# Patient Record
(demographics unavailable — no encounter records)

---

## 2024-11-11 NOTE — ASSESSMENT
[FreeTextEntry1] :  Surekha Balbuena is a 76 y/o female with compensated MASH cirrhosis, here for follow-up.  #Cirrhosis 2/2 MASLD, well compensated, MELD 8 - Chronic liver dx w/u notable for +REBA (non-specific) and +AMA, however, with normal LFTs and ALP, not clinically significant and does not have active PBC disease; increased risk to develop PBC and will monitor - Euvolemic on exam, no indication for diuretics - Monitor daily weights and report > 2-3 lb/day - HE: no sx, reinforced s/s and advised to report - pHTN: 5/19/22 EGD w/o EV > followed up with Dr. Chairez for EGD/EUS consult and left to patient preference to defer scoping at this time - Normal Plts and 11/2023 Fibroscan KpA 16.1, making borderline Baveno 7 assessment > update fibroscan  - HCC: 6/2024 US Abd w/o c/f malignancy > update again now and maintain biannual screening - Advised to avoid nephrotoxic agents - Encouraged to c/w Rudy in efforts of maintaining healthy weight and optimization of metabolic profile - Keep ETOH d/c given risk for further liver fibrosis/decompensation - Reinforced s/s of liver decompensation and advised to report immediately - No indication for liver txp evaluation given well compensated disease, low MELD, no episodes of liver decompensation and advanced age; will monitor clinical trajectory and medically manage  Update labs prior to f/u appt. RTC in 6 months. Plan discussed with Dr. Chaudhary.  Darline Lees, MSN, [facility] Transplant Hepatology Nurse Practitioner [facility] [address] [place] [address] T: [phone] | F: [phone].

## 2024-11-11 NOTE — PHYSICAL EXAM
[Scleral Icterus] : No Scleral Icterus [Spider Angioma] : No spider angioma(s) were observed [Ascites Shifting Dullness] : no shifting dullness of ascites [Asterixis] : no asterixis observed [Jaundice] : No jaundice [General Appearance - Alert] : alert [General Appearance - Well Nourished] : well nourished [Sclera] : the sclera and conjunctiva were normal [] : the neck was supple [Auscultation Breath Sounds / Voice Sounds] : lungs were clear to auscultation bilaterally [Respiration, Rhythm And Depth] : normal respiratory rhythm and effort [Heart Rate And Rhythm] : heart rate was normal and rhythm regular [Heart Sounds] : normal S1 and S2 [Bowel Sounds] : normal bowel sounds [Abdomen Soft] : soft [Abdomen Tenderness] : non-tender [Skin Color & Pigmentation] : normal skin color and pigmentation [Oriented To Time, Place, And Person] : oriented to person, place, and time

## 2024-11-11 NOTE — END OF VISIT
[facility] [phone]    [facility] VIDEO VISIT PROGRESS NOTE    CHIEF COMPLAINT  Chief Complaint   Patient presents with    Video Visit       SUBJECTIVE  HISTORY OF PRESENT ILLNESS:   Karime Carvalho is a 17 year old female who consents to a two-way Video Visit (V-Visit) for evaluation of an eye concern.      Karime reports redness , irritation, clear/watery drainage, purulent drainage, and crusting to the left eye. Symptom onset: 2/13 .   Reports use of contact lenses.  Photophobia: denies  Vision changes/blurred vision: denies  Foreign body sensation: denies   Injury/trauma to the eye: denies  The patient denies deep eyeball pain, pain with eyeball movement, headache, nausea, vomiting, or fever.     Associated symptoms or recent upper respiratory illness symptoms: none      Contact with person with similar symptoms: denies  The patient has tried nothing for their current symptoms, which has been ineffective.    HISTORIES  Current medications, allergies, past medical history, past surgical history and social history have been reviewed and updated in the electronic medical record.    ALLERGIES  ALLERGIES:  No Known Allergies     MEDICATIONS  Current Outpatient Medications   Medication Sig    ofloxacin (OCUFLOX) 0.3 % ophthalmic solution Indications: Bacterial Conjunctivitis 1 drop to affected eye(s) every 2 hours while awake until eye(s) clear, then 4 time daily for total of 5-7 days.    desogestrel-ethinyl estradiol (APRI) 0.15-30 MG-MCG per tablet Take 1 tablet by mouth daily.     No current facility-administered medications for this visit.        OBJECTIVE  PHYSICAL EXAM:   Information acquired with patient assistance, demonstration, and feedback due to two-way video visit method of visit. Portions of assessment may be difficult to visualize precisely given nature of technology and limitations of assessment with virtual platform.   CONSTITUTIONAL: Well-hydrated, well-nourished  [Time Spent: ___ minutes] : I have spent [unfilled] minutes of time on the encounter which excludes teaching and separately reported services. patient who appears to be in no acute distress. Awake, alert and cooperative.  NEUROLOGIC: Alert and oriented x 3.    SKIN: Skin normal color, no visual texture change, with no lesions or eruptions.  PSYCHIATRIC: Oriented to person, place, and time. The patient was able to demonstrate good judgement and reason, without hallucinations, abnormal affect or abnormal behaviors during the examination.   EYES: Right eye: conjunctiva normal, sclera normal, lid and lashes are normal, and no tearing or drainage. Left eye: conjunctiva erythematous and purulent drainage. No pain with palpation by patient bilaterally. Bilateral pupils are equal and round. Bilateral extraocular movements are normal. No ciliary flush or corneal opacity.     Inspection of the eye under magnification and including inversion of the upper and lower eye lids was not possible due to video constraints to exam modality therefore unable to rule out the presence of lesions, abrasions, ulcers, infiltrate, dendritic markings, or other abnormal findings.    ASSESSMENT/PLAN   Bacterial conjunctivitis  - ofloxacin (OCUFLOX) 0.3 % ophthalmic solution; Indications: Bacterial Conjunctivitis 1 drop to affected eye(s) every 2 hours while awake until eye(s) clear, then 4 time daily for total of 5-7 days.  Dispense: 5 mL; Refill: 0  -no contacts until eye symptoms have resolved  -don't touch or rub eyes  -wash hands frequently  -if symptoms persist or worsen an in person visit is recommended     The patient is in no acute distress and no evidence of red flag symptoms including eye pain/pressure, vision changes, fever, double vision were discussed and warrant immediate evaluation.     FOLLOW-UP   Return for As needed .  If the patient has unresolved symptoms, they have been advised to seek an in person evaluation with their primary care provider or at an Urgent Care/Immediate Care.    If symptoms get worse, the patient should be seen immediately at an Urgent  Care/Immediate Care or the Emergency Department.       PATIENT INSTRUCTIONS  Attached in After Visit Summary  The patient verbalizes understanding of the diagnosis and plan of care. There were no further questions or concerns.   They were advised to contact the [facility] RN with any questions at [phone].    CONSENT:  Patient consent was obtained for this Video Visit using the [employer] aly.    Clinical Location: [facility] Tele[facility] Visit - Home office  Patient is located at home in the [facility]    RANJEET Mckeon  2/14/2024  8:22 AM

## 2024-11-11 NOTE — HISTORY OF PRESENT ILLNESS
[FreeTextEntry1] : Transplant Hepatologist: Dana Chaudhary DO Transplant Hepatology NP: Darline Lees, [facility]  Surekha Balbuena is a 78 y/o female with a PMH of HTN, HLD (> 20 yrs), T2DM (dx 8-10 yrs), Osteoporosis, thyroid dysfunction, abnormal liver tests and MASH cirrhosis, here for follow-up.  Patient established care after referral from Dr. Chairez for evaluation of abnormal liver tests. Patient reports first being made aware of elevated LFTs ~15 yrs ago by PCP and no further w/u done. She then established care with new PCP that noted elevated LFTs and thought it was r/t T2DM and increased Metformin to aid with metabolic profile. She then established care with GI Dr. Chairez for colonoscopy that noted +AMA and elevated LFTs and was subsequently referred to us for further evaluation. No prior liver bx. No episodes of liver decompensation. No known family history of liver disease. No IVDU. No blood transfusions. OTC use of Vitamin D, Collagen and MVI. Denies prior or current heavy ETOH use. Long standing hx of being overweight with heaviest weight ~170 lbs one year ago, however, intentionally lost 20 lbs with Keto diet and has been maintained at current weight since (150 lbs). +Metabolic syndrome with longstanding HTN/HLD/T2DM. Born in  and ethnicity from [place]. 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 Independent in ADLs/iADLs.  - 3/28/22 Fibroscan: S0/F4 ( & kPa 23.9) - 5/19/22 EGD w/o EV - 8/19/23: +AMA 1:160, AST/ALT 37/31, normal ALP and Tbili - 3/24/23 US Abd: increased liver echogenicity c/w likely steatosis - 9/29/23: AST/ALT 25/22 - 10/31/23 CLD w/u: +REBA, +AMA, and mildly elevated AST (normal ALP) > +AMA not clinically significant given normal ALP (increased risk to develop PBC, will follow) - 11/2/23 Fibroscan F4 - 12/6/23 MRE: mild steatosis, no iron deposition and +cirrhosis (no ascites or splenomegaly) - 1.2cm Pancreatic cyst likely IPMN, increased in size since 2017 - 6/2024 US Abd: +Cirrhosis and no HCC  Stable weight since last visit, currently 135 lbs. Not really exercising and following healthy diet. No episodes of decompensation.   In the interim since last visit, there have been no additional illnesses or hospitalizations. No episodes of liver decompensation. Still maintained on low dose Mounjaro and weight stable since last visit, currently 135 lbs and 16 lbs down since establishing care with us. Not really exercising but maintaining diligence on healthy dieting. Patient's allergies, medications, past medical, surgical, family, and social histories were reviewed and updated as appropriate. Seen in clinic today, reports that she feels well and is w/o complaints. Denies any recent fatigue, fevers, chills, cough, lightheadedness, AMS, abdominal pain, n/v, diarrhea, hematochezia, hematemesis, and melena. Denies alcohol, tobacco, or recreational drug use.  MELD 8 (10/31/24)

## 2024-11-11 NOTE — REVIEW OF SYSTEMS
[Fever] : no fever [Chills] : no chills [Recent Weight Gain (___ Lbs)] : no recent weight gain [Chest Pain] : no chest pain [Palpitations] : no palpitations [Shortness Of Breath] : no shortness of breath [Cough] : no cough [Abdominal Pain] : no abdominal pain [Vomiting] : no vomiting [Constipation] : constipation [Diarrhea] : no diarrhea [Melena] : no melena [Dysuria] : no dysuria [Limb Swelling] : no limb swelling [Itching] : itching [Confused] : no confusion [Dizziness] : no dizziness [de-identified] : Chronic scalp itching

## 2024-12-20 NOTE — ASSESSMENT
[FreeTextEntry1] : FibroScan/ Vibration Controlled Transient Elastography (VCTE) Report   PROBE SIZE: M   INDICATION: NAFLD/ARAUJO   REFERRING PHYSICIAN: Darline Lees NP     PROCEDURE:   Patient was NPO for 4 hours prior to procedure. After providing oral explanations of the procedure to the patient, patient was placed in supine position with right arm in maximum abduction to allow optimal exposure of right lateral abdomen. Patient abdomen was briefly assessed to identify to the terminus of the xyphoid process. The ideal target testing spot was located mid-line and lateral to this point. To acquire proper signals, the skin to liver capsule distance was accessed with the FibroScan probe. Patient was instructed to breathe normally and to abstain from sudden movements during the procedure. Ten shear waves were produced and measurements of the speed of each wave evaluated. Patient tolerated the procedure well and was discharged without incident.     FINDINGS:   - Median shear wave speed of 2.08 meters/second.   - This corresponds to a median Liver Stiffness Score of 12.9 kPa.   - IQR/med 16%   -  dB/m   RESULTS based on  reference scales:   FIBROSIS: F3 Fibrosis   STEATOSIS: S0 - Stage (0-10% steatosis)   The results regarding fibrosis stage and/or steatosis grade are based on current published scientific literature and the provided patient's diagnosis. Any further medical or surgical intervention should be made by considering the overall medical condition of the patient.   Please note transient elastography does not directly measure liver fibrosis and fat content, therefore over-estimation of liver fibrosis may be observed in several conditions, including but not limited to: liver congestion, active hepatitis, ascites, mass within the liver. Clinical correlation is necessary, and liver stiffness readings need to be interpreted with consideration to these potential confounding factors.   Please note: If you have any questions about the FibroScan results or require interpretation of the findings, please contact and discuss them with the healthcare provider who ordered this test. If you would like to consult with one of our hepatology care providers, please call our office at [phone] to schedule an appointment.

## 2025-05-21 NOTE — REVIEW OF SYSTEMS
[Fever] : no fever [Chills] : no chills [Recent Weight Gain (___ Lbs)] : no recent weight gain [Chest Pain] : no chest pain [Palpitations] : no palpitations [Shortness Of Breath] : no shortness of breath [Cough] : no cough [Abdominal Pain] : no abdominal pain [Vomiting] : no vomiting [Diarrhea] : no diarrhea [Melena] : no melena [Dysuria] : no dysuria [Limb Swelling] : no limb swelling [Confused] : no confusion [Dizziness] : no dizziness [de-identified] : Chronic scalp itching

## 2025-05-21 NOTE — HISTORY OF PRESENT ILLNESS
[de-identified] : 76 y/o female with a PMH of HTN, HLD (> 20 yrs), T2DM (dx 8-10 yrs), Osteoporosis, thyroid dysfunction, abnormal liver tests and MASH cirrhosis, here for follow-up. asymtomatic moujaro increased to 5mg 3 weeks ago, has gained wt awaiting to repeat MRI for pancreatic cyst no new sx; no decompensation Diet - improved Activity - not much EXAM: [id number] - US ABDOMEN COMPLETE  - ORDERED BY: TWIN CRUZ   PROCEDURE DATE:  02/07/2025    INTERPRETATION:  CLINICAL INFORMATION: 76 y/o female with cirrhosis, need HCC screening;  COMPARISON: 6/6/2024.  TECHNIQUE: Sonography of the abdomen.  FINDINGS: Liver: Coarsened echotexture. Bile ducts: Normal caliber. Common bile duct measures 5 mm. Gallbladder: Within normal limits. Pancreas: Visualized portions are within normal limits. Spleen: 9.2 cm. Within normal limits. Right kidney: 9.9 cm. No hydronephrosis. Left kidney: 9.8 cm. No hydronephrosis. Ascites: None. Aorta and IVC: Visualized portions are within normal limits.  IMPRESSION: Coarsened hepatic echotexture without focal hepatic lesion.  12/2024- - This corresponds to a median Liver Stiffness Score of 12.9 kPa.  - IQR/med 16%  -  dB/m  RESULTS based on  reference scales:  FIBROSIS: F3 Fibrosis  STEATOSIS: S0 - Stage (0-10% steatosis)  EXAM: [id number] - MR ELASTOGRAPHY  - ORDERED BY: TWIN CRUZ  EXAM: [id number] - MR ABDOMEN WAW IC  - ORDERED BY: TWIN CRUZ   PROCEDURE DATE:  12/06/2023    INTERPRETATION:  CLINICAL INFORMATION: Risk factors for fatty liver, Positive AMA with Fibroscan noting level 4.  COMPARISON: MRI 10/7/2017.  CONTRAST/COMPLICATIONS: IV Contrast: Gadavist  7 cc administered   3 cc discarded Oral Contrast: NONE Complications: None reported at time of study completion  PROCEDURE: MRI of the abdomen was performed. MRCP was performed.  Fat and Iron Quantification with GE IDEAL IQ: Fat: Normal (less than 5%), Mild (>5 and <15%), Moderate to Severe (>15%) Iron Concentration/Hepatic R2* at 3.0T: Normal (less than 115 s-1), Mild (>115 and <400 s-1) Moderate to Severe (>400 s-1)  Elastography was performed:  < 2.5 kPa:    Normal 2.5-2.9 kPa: Normal or inflammation 2.9-3.5 kPa: Stage 1 to 2 fibrosis 3.5-4 kPa:    Stage 2 to 3 fibrosis 4-5 kPa:       Stage 3 to 4 fibrosis > 5 kPa:       Stage 4 fibrosis/cirrhosis  FINDINGS:  LIVER:  Cirrhotic morphology. Steatosis. Subcentimeter left hepatic cyst. No evidence of HCC.  Hepatic fat fraction: 8-9 % Hepatic Iron R2*: 30-36 s-1 Hepatic stiffness: 5.7 kPa  BILE DUCTS: Normal caliber. GALLBLADDER: Within normal limits. SPLEEN: Within normal limits. PANCREAS: A 1.2 cm cyst is noted up to 9 mm in the neck of the pancreas (9, 12), in retrospect increased from 6 mm previously. No associated suspicious enhancement or main duct dilatation. The pancreas is otherwise. ADRENALS: Within normal limits. KIDNEYS/URETERS: Small left renal parapelvic and cortical cysts. No hydronephrosis..  VISUALIZED PORTIONS:  BOWEL: Moderate size hiatal hernia. PERITONEUM: No ascites. VESSELS: Hepatic and portal veins and SMV are patent. No abdominal aortic aneurysm. RETROPERITONEUM/LYMPH NODES: No lymphadenopathy. ABDOMINAL WALL: Within normal limits.  IMPRESSION: Cirrhosis and steatosis. No evidence of HCC.  A small 1.2 cm pancreatic cyst, possibly a sidebranch IPMN, enlarged from 2017 without suspicious enhancement. Endoscopic ultrasound correlation can be obtained as clinically indicated.  Hepatic Fat Fraction: Mild steatosis. Hepatic Iron Deposition: None. Hepatic stiffness: > 5 kPa: Stage 4 fibrosis/cirrhosis meds crestor, amlodipine, levothyroxine, mounjaro, omeprazole, prolia, vit D Previous Note- Patient established care after referral from Dr. Chairez for evaluation of abnormal liver tests. Patient reports first being made aware of elevated LFTs ~15 yrs ago by PCP and no further w/u done. She then established care with new PCP that noted elevated LFTs and thought it was r/t T2DM and increased Metformin to aid with metabolic profile. She then established care with GI Dr. Chairez for colonoscopy that noted +AMA and elevated LFTs and was subsequently referred to us for further evaluation. No prior liver bx. No episodes of liver decompensation. No known family history of liver disease. No IVDU. No blood transfusions. OTC use of Vitamin D, Collagen and MVI. Denies prior or current heavy ETOH use. Long standing hx of being overweight with heaviest weight ~170 lbs one year ago, however, intentionally lost 20 lbs with Keto diet and has been maintained at current weight since (150 lbs). +Metabolic syndrome with longstanding HTN/HLD/T2DM. Born in  and ethnicity from [place]. 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 Independent in ADLs/iADLs.  - 3/28/22 Fibroscan: S0/F4 ( & kPa 23.9) - 5/19/22 EGD w/o EV - 8/19/23: +AMA 1:160, AST/ALT 37/31, normal ALP and Tbili - 3/24/23 US Abd: increased liver echogenicity c/w likely steatosis - 9/29/23: AST/ALT 25/22 - 10/31/23 CLD w/u: +REBA, +AMA, and mildly elevated AST (normal ALP) > +AMA not clinically significant given normal ALP (increased risk to develop PBC, will follow) - 11/2/23 Fibroscan F4 - 12/6/23 MRE: mild steatosis, no iron deposition and +cirrhosis (no ascites or splenomegaly) - 1.2cm Pancreatic cyst likely IPMN, increased in size since 2017 - 6/2024 US Abd: +Cirrhosis and no HCC  Stable weight since last visit, currently 135 lbs. Not really exercising and following healthy diet. No episodes of decompensation.   In the interim since last visit, there have been no additional illnesses or hospitalizations. No episodes of liver decompensation. Still maintained on low dose Mounjaro and weight stable since last visit, currently 135 lbs and 16 lbs down since establishing care with us. Not really exercising but maintaining diligence on healthy dieting. Patient's allergies, medications, past medical, surgical, family, and social histories were reviewed and updated as appropriate. Seen in clinic today, reports that she feels well and is w/o complaints. Denies any recent fatigue, fevers, chills, cough, lightheadedness, AMS, abdominal pain, n/v, diarrhea, hematochezia, hematemesis, and melena. Denies alcohol, tobacco, or recreational drug use.  MELD 8 (10/31/24)

## 2025-05-21 NOTE — ASSESSMENT
[FreeTextEntry1] : Surekha Balbuena is a 78 y/o female with compensated MASH cirrhosis, here for follow-up.  #Cirrhosis 2/2 MASLD, well compensated, MELD 8 - Chronic liver dx w/u notable for +REBA (non-specific) and +AMA, however, with normal LFTs and ALP, not clinically significant and does not have active PBC disease; increased risk to develop PBC and will monitor - Euvolemic on exam, no indication for diuretics - Monitor daily weights and report > 2-3 lb/day - HE: no sx, reinforced s/s and advised to report - pHTN: 5/19/22 EGD w/o EV > followed up with Dr. Chairez for EGD/EUS consult and left to patient preference to defer scoping at this time - Normal Plts and 11/2023 Fibroscan KpA 16.1, > 12.9, hold bblk for now - HCC: feb'25 US neg for hcc pancreatic cyst; for MRI via Dr. Chairez - Advised to avoid nephrotoxic agents - Encouraged to c/w Rudy in efforts of maintaining healthy weight and optimization of metabolic profile - Keep ETOH d/c given risk for further liver fibrosis/decompensation - Reinforced s/s of liver decompensation and advised to report immediately - No indication for liver txp evaluation given well compensated disease, low MELD, no episodes of liver decompensation and advanced age; will monitor clinical trajectory and medically manage  follow up in 6 months I spent total of 40 minutes on this patient encounter. All questions answered, demonstrated understanding
Severely impaired: cannot locate objects without hearing or touching them or patient nonresponsive.

## 2025-05-21 NOTE — PHYSICAL EXAM
[Scleral Icterus] : No Scleral Icterus [Spider Angioma] : No spider angioma(s) were observed [Ascites Shifting Dullness] : no shifting dullness of ascites [Asterixis] : no asterixis observed [Jaundice] : No jaundice